# Patient Record
Sex: MALE | Race: WHITE | NOT HISPANIC OR LATINO | Employment: FULL TIME | URBAN - METROPOLITAN AREA
[De-identification: names, ages, dates, MRNs, and addresses within clinical notes are randomized per-mention and may not be internally consistent; named-entity substitution may affect disease eponyms.]

---

## 2024-01-11 ENCOUNTER — APPOINTMENT (OUTPATIENT)
Dept: CT IMAGING | Facility: HOSPITAL | Age: 69
End: 2024-01-11
Payer: COMMERCIAL

## 2024-01-11 ENCOUNTER — HOSPITAL ENCOUNTER (EMERGENCY)
Facility: HOSPITAL | Age: 69
Discharge: HOME | End: 2024-01-11
Attending: EMERGENCY MEDICINE
Payer: COMMERCIAL

## 2024-01-11 VITALS
WEIGHT: 170.42 LBS | OXYGEN SATURATION: 97 % | BODY MASS INDEX: 25.24 KG/M2 | HEIGHT: 69 IN | SYSTOLIC BLOOD PRESSURE: 164 MMHG | RESPIRATION RATE: 18 BRPM | DIASTOLIC BLOOD PRESSURE: 81 MMHG | HEART RATE: 53 BPM

## 2024-01-11 DIAGNOSIS — J40 BRONCHITIS: ICD-10-CM

## 2024-01-11 DIAGNOSIS — K20.90 ESOPHAGITIS: ICD-10-CM

## 2024-01-11 DIAGNOSIS — R04.2 HEMOPTYSIS: Primary | ICD-10-CM

## 2024-01-11 DIAGNOSIS — R91.1 LUNG NODULE: ICD-10-CM

## 2024-01-11 LAB
ALBUMIN SERPL-MCNC: 4.4 G/DL (ref 3.5–5.2)
ALBUMIN/GLOB SERPL: 1.8 G/DL
ALP SERPL-CCNC: 66 U/L (ref 39–117)
ALT SERPL W P-5'-P-CCNC: 12 U/L (ref 1–41)
ANION GAP SERPL CALCULATED.3IONS-SCNC: 10.6 MMOL/L (ref 5–15)
APTT PPP: 27.5 SECONDS (ref 78–95.9)
AST SERPL-CCNC: 12 U/L (ref 1–40)
BASOPHILS # BLD AUTO: 0.03 10*3/MM3 (ref 0–0.2)
BASOPHILS NFR BLD AUTO: 0.2 % (ref 0–1.5)
BILIRUB SERPL-MCNC: 0.2 MG/DL (ref 0–1.2)
BUN SERPL-MCNC: 19 MG/DL (ref 8–23)
BUN/CREAT SERPL: 21.1 (ref 7–25)
CALCIUM SPEC-SCNC: 9.1 MG/DL (ref 8.6–10.5)
CHLORIDE SERPL-SCNC: 106 MMOL/L (ref 98–107)
CO2 SERPL-SCNC: 25.4 MMOL/L (ref 22–29)
CREAT SERPL-MCNC: 0.9 MG/DL (ref 0.76–1.27)
DEPRECATED RDW RBC AUTO: 47.4 FL (ref 37–54)
EGFRCR SERPLBLD CKD-EPI 2021: 94.2 ML/MIN/1.73
EOSINOPHIL # BLD AUTO: 0.07 10*3/MM3 (ref 0–0.4)
EOSINOPHIL NFR BLD AUTO: 0.4 % (ref 0.3–6.2)
ERYTHROCYTE [DISTWIDTH] IN BLOOD BY AUTOMATED COUNT: 13.7 % (ref 12.3–15.4)
ETHANOL BLD-MCNC: <10 MG/DL (ref 0–10)
ETHANOL UR QL: <0.01 %
FLUAV SUBTYP SPEC NAA+PROBE: NOT DETECTED
FLUBV RNA ISLT QL NAA+PROBE: NOT DETECTED
GLOBULIN UR ELPH-MCNC: 2.4 GM/DL
GLUCOSE SERPL-MCNC: 91 MG/DL (ref 65–99)
HCT VFR BLD AUTO: 42 % (ref 37.5–51)
HGB BLD-MCNC: 13.8 G/DL (ref 13–17.7)
HOLD SPECIMEN: NORMAL
HOLD SPECIMEN: NORMAL
IMM GRANULOCYTES # BLD AUTO: 0.07 10*3/MM3 (ref 0–0.05)
IMM GRANULOCYTES NFR BLD AUTO: 0.4 % (ref 0–0.5)
INR PPP: 0.89 (ref 0.86–1.15)
LYMPHOCYTES # BLD AUTO: 2.1 10*3/MM3 (ref 0.7–3.1)
LYMPHOCYTES NFR BLD AUTO: 13.5 % (ref 19.6–45.3)
MCH RBC QN AUTO: 30.8 PG (ref 26.6–33)
MCHC RBC AUTO-ENTMCNC: 32.9 G/DL (ref 31.5–35.7)
MCV RBC AUTO: 93.8 FL (ref 79–97)
MONOCYTES # BLD AUTO: 1.22 10*3/MM3 (ref 0.1–0.9)
MONOCYTES NFR BLD AUTO: 7.8 % (ref 5–12)
NEUTROPHILS NFR BLD AUTO: 12.12 10*3/MM3 (ref 1.7–7)
NEUTROPHILS NFR BLD AUTO: 77.7 % (ref 42.7–76)
NRBC BLD AUTO-RTO: 0 /100 WBC (ref 0–0.2)
PLATELET # BLD AUTO: 245 10*3/MM3 (ref 140–450)
PMV BLD AUTO: 9.3 FL (ref 6–12)
POTASSIUM SERPL-SCNC: 4.4 MMOL/L (ref 3.5–5.2)
PROT SERPL-MCNC: 6.8 G/DL (ref 6–8.5)
PROTHROMBIN TIME: 12.2 SECONDS (ref 11.8–14.9)
RBC # BLD AUTO: 4.48 10*6/MM3 (ref 4.14–5.8)
RSV RNA NPH QL NAA+NON-PROBE: NOT DETECTED
SARS-COV-2 RNA RESP QL NAA+PROBE: NOT DETECTED
SODIUM SERPL-SCNC: 142 MMOL/L (ref 136–145)
WBC NRBC COR # BLD AUTO: 15.61 10*3/MM3 (ref 3.4–10.8)
WHOLE BLOOD HOLD COAG: NORMAL
WHOLE BLOOD HOLD SPECIMEN: NORMAL

## 2024-01-11 PROCEDURE — 99285 EMERGENCY DEPT VISIT HI MDM: CPT

## 2024-01-11 PROCEDURE — 25810000003 SODIUM CHLORIDE 0.9 % SOLUTION: Performed by: EMERGENCY MEDICINE

## 2024-01-11 PROCEDURE — 82077 ASSAY SPEC XCP UR&BREATH IA: CPT | Performed by: EMERGENCY MEDICINE

## 2024-01-11 PROCEDURE — 36415 COLL VENOUS BLD VENIPUNCTURE: CPT

## 2024-01-11 PROCEDURE — 25510000001 IOPAMIDOL PER 1 ML: Performed by: EMERGENCY MEDICINE

## 2024-01-11 PROCEDURE — 85025 COMPLETE CBC W/AUTO DIFF WBC: CPT

## 2024-01-11 PROCEDURE — 85610 PROTHROMBIN TIME: CPT | Performed by: EMERGENCY MEDICINE

## 2024-01-11 PROCEDURE — 80053 COMPREHEN METABOLIC PANEL: CPT | Performed by: EMERGENCY MEDICINE

## 2024-01-11 PROCEDURE — 71260 CT THORAX DX C+: CPT

## 2024-01-11 PROCEDURE — 87637 SARSCOV2&INF A&B&RSV AMP PRB: CPT | Performed by: EMERGENCY MEDICINE

## 2024-01-11 PROCEDURE — 85730 THROMBOPLASTIN TIME PARTIAL: CPT | Performed by: EMERGENCY MEDICINE

## 2024-01-11 RX ORDER — SUCRALFATE 1 G/1
1 TABLET ORAL 4 TIMES DAILY
Qty: 40 TABLET | Refills: 0 | Status: SHIPPED | OUTPATIENT
Start: 2024-01-11

## 2024-01-11 RX ORDER — ESOMEPRAZOLE MAGNESIUM 40 MG/1
40 CAPSULE, DELAYED RELEASE ORAL
Qty: 20 CAPSULE | Refills: 0 | Status: SHIPPED | OUTPATIENT
Start: 2024-01-11

## 2024-01-11 RX ORDER — AZITHROMYCIN 250 MG/1
TABLET, FILM COATED ORAL
Qty: 6 TABLET | Refills: 0 | Status: SHIPPED | OUTPATIENT
Start: 2024-01-11

## 2024-01-11 RX ADMIN — IOPAMIDOL 100 ML: 755 INJECTION, SOLUTION INTRAVENOUS at 15:17

## 2024-01-11 RX ADMIN — SODIUM CHLORIDE 500 ML: 9 INJECTION, SOLUTION INTRAVENOUS at 14:34

## 2024-01-11 NOTE — ED PROVIDER NOTES
Time: 1:03 PM EST  Date of encounter:  1/11/2024  Independent Historian/Clinical History and Information was obtained by:   Patient    History is limited by: N/A    Chief Complaint: coughing up blood.      History of Present Illness:  Patient is a 68 y.o. year old male who presents to the emergency department for evaluation of coughing up blood.  The patient states that he was coughing in his sleep last pm and woke up with blood on his sheets.  He is currently asymptomatic but says he has had a cough without fever.  The triage note stats that he has abdominal pain but he denies abdominal pain with me. He is a smoker and does drink alcohol regularly.     HPI    Patient Care Team  Primary Care Provider: Provider, No Known    Past Medical History:     Allergies   Allergen Reactions    Morphine Mental Status Change     History reviewed. No pertinent past medical history.  Past Surgical History:   Procedure Laterality Date    ANKLE SURGERY Right     BACK SURGERY       History reviewed. No pertinent family history.    Home Medications:  Prior to Admission medications    Medication Sig Start Date End Date Taking? Authorizing Provider   acetaminophen (TYLENOL) 500 MG tablet Take 1 tablet by mouth Every 6 (Six) Hours As Needed for Mild Pain. 1/8/24   Petra Gann APRN   Diclofenac Sodium (VOLTAREN) 1 % gel gel Apply 4 g topically to the appropriate area as directed 4 (Four) Times a Day As Needed (back pain). 1/8/24   Petra Gann APRN   ibuprofen (ADVIL,MOTRIN) 800 MG tablet Take 1 tablet by mouth Every 8 (Eight) Hours As Needed for Mild Pain, Moderate Pain, Fever or Headache. 1/8/24   Petra Gann APRN   methocarbamol (ROBAXIN) 500 MG tablet Take 1 tablet by mouth 4 (Four) Times a Day As Needed for Muscle Spasms. 1/8/24   Petra Gann APRN   predniSONE (DELTASONE) 20 MG tablet Take 1 tablet by mouth Daily for 5 days. 1/8/24 1/13/24  Petra Gann APRN        Social History:   Social History  "    Tobacco Use    Smoking status: Every Day     Packs/day: 1     Types: Cigarettes    Smokeless tobacco: Never   Vaping Use    Vaping Use: Never used   Substance Use Topics    Alcohol use: Yes    Drug use: Never         Review of Systems:  Review of Systems   Constitutional:  Negative for chills and fever.   HENT:  Negative for congestion, ear pain and sore throat.    Eyes:  Negative for pain.   Respiratory:  Positive for cough. Negative for chest tightness and shortness of breath.         Report of hemoptysis   Cardiovascular:  Negative for chest pain.   Gastrointestinal:  Negative for abdominal pain, diarrhea, nausea and vomiting.   Genitourinary:  Negative for flank pain and hematuria.   Musculoskeletal:  Negative for joint swelling.   Skin:  Negative for pallor.   Neurological:  Negative for seizures and headaches.   All other systems reviewed and are negative.       Physical Exam:  /81 (BP Location: Right arm, Patient Position: Sitting)   Pulse 53   Resp 18   Ht 175.3 cm (69\")   Wt 77.3 kg (170 lb 6.7 oz)   SpO2 97%   BMI 25.17 kg/m²     Physical Exam  Vitals and nursing note reviewed.   Constitutional:       General: He is not in acute distress.     Appearance: Normal appearance. He is not toxic-appearing.   HENT:      Head: Normocephalic and atraumatic.      Mouth/Throat:      Mouth: Mucous membranes are moist.   Eyes:      General: No scleral icterus.  Cardiovascular:      Rate and Rhythm: Normal rate and regular rhythm.      Pulses: Normal pulses.      Heart sounds: Normal heart sounds.   Pulmonary:      Effort: Pulmonary effort is normal. No respiratory distress.      Breath sounds: Normal breath sounds.   Abdominal:      General: Abdomen is flat.      Palpations: Abdomen is soft.      Tenderness: There is no abdominal tenderness.   Musculoskeletal:         General: Normal range of motion.      Cervical back: Normal range of motion and neck supple.   Skin:     General: Skin is warm and dry. "   Neurological:      Mental Status: He is alert and oriented to person, place, and time. Mental status is at baseline.                  Procedures:  Procedures      Medical Decision Making:      Comorbidities that affect care:    Smoking    External Notes reviewed:    Previous Clinic Note:  notes for same symptoms      The following orders were placed and all results were independently analyzed by me:  Orders Placed This Encounter   Procedures    COVID-19, FLU A/B, RSV PCR 1 HR TAT - Swab, Nasopharynx    CT Chest With Contrast Diagnostic    [place] Draw    Comprehensive Metabolic Panel    CBC Auto Differential    aPTT    Protime-INR    Ethanol    CBC & Differential    Green Top (Gel)    Lavender Top    Gold Top - SST    Light Blue Top       Medications Given in the Emergency Department:  Medications   sodium chloride 0.9 % bolus 500 mL (0 mL Intravenous Stopped 1/11/24 1659)   iopamidol (ISOVUE-370) 76 % injection 100 mL (100 mL Intravenous Given 1/11/24 1517)        ED Course:         Labs:    Lab Results (last 24 hours)       ** No results found for the last 24 hours. **             Imaging:    CT Chest With Contrast Diagnostic    Result Date: 1/11/2024  PROCEDURE: CT CHEST W CONTRAST DIAGNOSTIC  COMPARISON:  [facility], XR CHEST 2 VW, 1/11/2024, 11:01. INDICATIONS: Hemoptysis  TECHNIQUE: After obtaining the patient's consent, CT images were obtained with non-ionic intravenous contrast material.   PROTOCOL:   Pulmonary embolism imaging protocol performed    RADIATION:   DLP: 348.1mGy*cm   Automated exposure control was utilized to minimize radiation dose. CONTRAST: 100cc Isovue 370 I.V.  TECHNIQUE: Axial images of the chest with intravenous contrast.  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  The thoracic aorta has a normal caliber.  Coronary artery calcification is present.  There is mild emphysema.  No cavitary lesions are identified.  There is a 1 cm  noncalcified nodule laterally in the right lower lobe.  There is mild right hilar adenopathy.  No evidence of pleural or pericardial effusion.  There is a 4.5 cm gastric diverticulum.  There is a 3.4 cm right adrenal mass measuring -4 Hounsfield units consistent with an adenoma.  There is a small hiatal hernia with mild wall thickening in the distal esophagus.  There are areas of bronchial wall thickening possibly secondary to COPD/bronchitis.  There is mild atelectasis in the mid and lower lung fields.  Mild degenerative changes are present in the thoracic spine.  3 mm radiopaque foreign body in the subcutaneous fat adjacent to the lower sternum.  1.7 cm exophytic nodule adjacent to the inferior right hepatic lobe.  This may represent a cyst or hemangioma.  IMPRESSION:  1. 1 cm noncalcified nodule laterally in the right lower lobe with mild right hilar adenopathy.  Recommend PET-CT scan to evaluate for malignancy.  2. Mild emphysema.  Coronary artery calcification.  3. Areas of bronchial wall thickening possibly secondary to COPD/bronchitis.   4. 3.4 cm right adrenal adenoma.   5. Small hiatal hernia with mild wall thickening in the distal esophagus.  Suggest upper endoscopy to evaluate for mass or esophagitis.   EDMOND MONCADA MD       Electronically Signed and Approved By: EDMOND MONCADA MD on 1/11/2024 at 15:37                Differential Diagnosis and Discussion:    Cough: Differential diagnosis includes but is not limited to pneumonia, acute bronchitis, upper respiratory infection, ACE inhibitor use, allergic reaction, epiglottitis, seasonal allergies, chemical irritants, exercise-induced asthma, viral syndrome.    All labs were reviewed and interpreted by me.    MDM  Number of Diagnoses or Management Options  Bronchitis  Esophagitis  Hemoptysis  Lung nodule  Diagnosis management comments: The patient presents with a history of hemoptysis during the night.  He drinks alcohol regularly and he also smokes.   It is unclear whether this was in fact hemoptysis or upper gi hemorrhage.  At any rate, the patient has been asymptomatic  and stable his entire ED stay and has had no further signs of bleeding.  In addition, he has no signs of discomfort.  The etiology of his bleeding could be pulmonary or upper GI. He will be prescribed appropriate medications and he is to follow up with both GI and Pulmonary       Amount and/or Complexity of Data Reviewed  Clinical lab tests: reviewed  Tests in the radiology section of CPT®: reviewed                 Patient Care Considerations:    CT ABDOMEN AND PELVIS: I considered ordering a CT scan of the abdomen and pelvis however the patient has no pain or tenderness      Consultants/Shared Management Plan:    None    Social Determinants of Health:    Patient is independent, reliable, and has access to care.       Disposition and Care Coordination:    Discharged: I considered escalation of care by admitting this patient to the hospital, however the patient has improved and is suitable and stable for discharge.    I have explained discharge medications and the need for follow up with the patient/caretakers. This was also printed in the discharge instructions. Patient was discharged with the following medications and follow up:      Medication List        New Prescriptions      azithromycin 250 MG tablet  Commonly known as: Zithromax Z-Boogie  Take 2 tablets by mouth on day 1, then 1 tablet daily on days 2-5     esomeprazole 40 MG capsule  Commonly known as: nexIUM  Take 1 capsule by mouth Every Morning Before Breakfast.     sucralfate 1 g tablet  Commonly known as: CARAFATE  Take 1 tablet by mouth 4 (Four) Times a Day.               Where to Get Your Medications        These medications were sent to [id number] - [place], [place] - [address] AT [facility] [address] - [phone]  - [phone] FX  [address] [facility] [place] [address]      Phone:  [phone]   azithromycin 250 MG tablet  esomeprazole 40 MG capsule  sucralfate 1 g tablet      Calvin Barakat MD  [id number] [facility]  [address] [place] [address]  [phone]          Ahsan Huntley MD  [address] [place] [address]  [phone]             Final diagnoses:   Hemoptysis   Lung nodule   Esophagitis   Bronchitis        ED Disposition       ED Disposition   Discharge    Condition   Stable    Comment   --               This medical record created using voice recognition software.             Rodolfo Mcdermott,   01/12/24 1432

## 2024-01-11 NOTE — Clinical Note
[facility] EMERGENCY ROOM  [address]  [place] [address]  Phone: [phone]    Keo Mix was seen and treated in our emergency department on 1/11/2024.  He may return to work on 01/13/2024.         Thank you for choosing [facility].    Rodolfo Mcdermott, DO       2 seconds or less

## 2024-01-11 NOTE — DISCHARGE INSTRUCTIONS
Start your diet with clear liquids and advance slowly.  Do not smoke.  Do not drink alcohol.  Take Mylanta between was at bedtime.  Take medication as prescribed.  Return for worsening symptoms.  Follow-up with lung doctor regarding lung nodule.